# Patient Record
Sex: FEMALE | Race: WHITE | Employment: OTHER | URBAN - METROPOLITAN AREA
[De-identification: names, ages, dates, MRNs, and addresses within clinical notes are randomized per-mention and may not be internally consistent; named-entity substitution may affect disease eponyms.]

---

## 2018-09-05 ENCOUNTER — APPOINTMENT (OUTPATIENT)
Dept: GENERAL RADIOLOGY | Facility: CLINIC | Age: 68
End: 2018-09-05
Attending: EMERGENCY MEDICINE
Payer: MEDICARE

## 2018-09-05 ENCOUNTER — HOSPITAL ENCOUNTER (EMERGENCY)
Facility: CLINIC | Age: 68
Discharge: HOME | End: 2018-09-05
Attending: EMERGENCY MEDICINE | Admitting: EMERGENCY MEDICINE
Payer: MEDICARE

## 2018-09-05 ENCOUNTER — APPOINTMENT (OUTPATIENT)
Dept: CT IMAGING | Facility: CLINIC | Age: 68
End: 2018-09-05
Attending: EMERGENCY MEDICINE
Payer: MEDICARE

## 2018-09-05 VITALS
RESPIRATION RATE: 18 BRPM | SYSTOLIC BLOOD PRESSURE: 102 MMHG | TEMPERATURE: 97.5 F | OXYGEN SATURATION: 100 % | DIASTOLIC BLOOD PRESSURE: 71 MMHG

## 2018-09-05 DIAGNOSIS — S42.201A CLOSED FRACTURE OF PROXIMAL END OF RIGHT HUMERUS, UNSPECIFIED FRACTURE MORPHOLOGY, INITIAL ENCOUNTER: ICD-10-CM

## 2018-09-05 PROCEDURE — 99284 EMERGENCY DEPT VISIT MOD MDM: CPT | Mod: 25

## 2018-09-05 PROCEDURE — 70450 CT HEAD/BRAIN W/O DYE: CPT

## 2018-09-05 PROCEDURE — 23600 CLTX PROX HUMRL FX W/O MNPJ: CPT

## 2018-09-05 PROCEDURE — 73060 X-RAY EXAM OF HUMERUS: CPT | Mod: RT

## 2018-09-05 PROCEDURE — 25000132 ZZH RX MED GY IP 250 OP 250 PS 637: Mod: GY | Performed by: EMERGENCY MEDICINE

## 2018-09-05 PROCEDURE — 73090 X-RAY EXAM OF FOREARM: CPT | Mod: RT

## 2018-09-05 PROCEDURE — 72125 CT NECK SPINE W/O DYE: CPT

## 2018-09-05 PROCEDURE — A9270 NON-COVERED ITEM OR SERVICE: HCPCS | Mod: GY | Performed by: EMERGENCY MEDICINE

## 2018-09-05 RX ORDER — LEVOTHYROXINE SODIUM 100 UG/1
TABLET ORAL
COMMUNITY
Start: 2018-08-13

## 2018-09-05 RX ORDER — WARFARIN SODIUM 10 MG/1
TABLET ORAL
COMMUNITY
Start: 2017-12-12

## 2018-09-05 RX ORDER — WARFARIN SODIUM 5 MG/1
TABLET ORAL
COMMUNITY
Start: 2018-01-18

## 2018-09-05 RX ORDER — FERROUS SULFATE 325(65) MG
325 TABLET ORAL
COMMUNITY
Start: 2015-11-03

## 2018-09-05 RX ORDER — OXYCODONE HYDROCHLORIDE 5 MG/1
5 TABLET ORAL ONCE
Status: COMPLETED | OUTPATIENT
Start: 2018-09-05 | End: 2018-09-05

## 2018-09-05 RX ORDER — OXYCODONE HYDROCHLORIDE 5 MG/1
5 TABLET ORAL EVERY 6 HOURS PRN
Qty: 12 TABLET | Refills: 0 | Status: SHIPPED | OUTPATIENT
Start: 2018-09-05

## 2018-09-05 RX ORDER — ACETAMINOPHEN 325 MG/1
975 TABLET ORAL ONCE
Status: COMPLETED | OUTPATIENT
Start: 2018-09-05 | End: 2018-09-05

## 2018-09-05 RX ORDER — ACETAMINOPHEN 500 MG
500 TABLET ORAL EVERY 6 HOURS PRN
Qty: 60 TABLET | Refills: 0 | Status: SHIPPED | OUTPATIENT
Start: 2018-09-05 | End: 2018-09-12

## 2018-09-05 RX ADMIN — OXYCODONE HYDROCHLORIDE 5 MG: 5 TABLET ORAL at 09:04

## 2018-09-05 RX ADMIN — ACETAMINOPHEN 975 MG: 325 TABLET, FILM COATED ORAL at 09:04

## 2018-09-05 ASSESSMENT — ENCOUNTER SYMPTOMS
NECK PAIN: 0
ARTHRALGIAS: 1
MYALGIAS: 1

## 2018-09-05 NOTE — ED AVS SNAPSHOT
[facility] Emergency Department    [address]    [facility] [address]    Phone:  [phone]    Fax:  [phone]                                       Vickie Gama   MRN: [id number]    Department:  [facility] Emergency Department   Date of Visit:  9/5/2018           After Visit Summary Signature Page     I have received my discharge instructions, and my questions have been answered. I have discussed any challenges I see with this plan with the nurse or doctor.    ..........................................................................................................................................  Patient/Patient Representative Signature      ..........................................................................................................................................  Patient Representative Print Name and Relationship to Patient    ..................................................               ................................................  Date                                            Time    ..........................................................................................................................................  Reviewed by Signature/Title    ...................................................              ..............................................  Date                                                            Time          22EPIC Rev 08/18

## 2018-09-05 NOTE — ED PROVIDER NOTES
History     Chief Complaint:  Arm pain after a fall     HPI:   The history is provided by the patient.      Vickie Gama is a 67 year old female on Coumadin with a history of anemia, PE/DVT, thyroid disease who presents to the emergency department with a male  for evaluation after a fall. The patient was outside walking this morning when she slipped in some leaves onto a rock. Her right shoulder/bicep took most of the impact. She did graze her head but had no loss of consciousness and remembers the entire incident. She was able to walk after the incident. She has exacerbated pain with any movement and does have a component of pain in the forearm. They present today for evaluation. Here, the patient rates her pain a 10/10 in severity. No lower extremity pain or balance issues. Denies any elbow, chest wall, or neck pain and has no other complaints.      Allergies:  Penicillins  Sulfa Drugs      Medications:    Iron   Levothyroxine   Prozac  Prilosec   Coumadin 10 mg   Coumadin 5 mg      Past Medical History:    Anemia   Depressive disorder  Thyroid disease   PE  DVT  Protein S deficiency     Past Surgical History:    Appendectomy   EGD  Gyn surgery     Family History:    History reviewed. No pertinent family history.      Social History:  Presents with male     Tobacco use: 1.00 PPD   Alcohol use: No   PCP: Sury Mackay    Marital Status:        Review of Systems   Musculoskeletal: Positive for arthralgias and myalgias. Negative for gait problem and neck pain.   Neurological: Negative for syncope.   All other systems reviewed and are negative.    Physical Exam     Patient Vitals for the past 24 hrs:   BP Temp Temp src Heart Rate Resp SpO2   09/05/18 1049 151/82 - - 65 - 97 %   09/05/18 0959 149/86 - - 67 - 98 %   09/05/18 0819 (!) 150/99 97.5  F (36.4  C) Oral 77 16 96 %        Physical Exam  Eyes: EOMI  ENT: No hemotympanum, dental trauma, or oropharyngeal  lacerations  Respiratory: Normal effort, symmetric chest rise, breath sounds equal bilaterally  Cardiovascular: Distal pulses palpable and symmetric, distal extremities warm, heart rate normal and rhythm regular  Gastrointestinal: No tenderness to palpation or guarding  MSK: No tenderness of facial bones, no step offs of scalp, no cervical spine tenderness or step offs, full ROM of neck without pain or rigidity, no thoracic or lumbar spine tenderness or step offs, no chest wall tenderness or crepitus, no long bone deformities or tenderness, full ROM of all extremities.     Right upper extremity: Holding right arm across her torso. Full strength and sensation of all three nerves of hand and forearm. Elbow and shoulder range of motion was deferred due to pain. Tenderness at the radial head. Tenderness of the humerus. No clavicle or chest wall tenderness.   Skin: No diaphoresis, lacerations, or lesions  Neurologic: Alert and oriented, follows commands in all extremities, sensation to light touch intact in all extremities, full strength and coordination    Emergency Department Course     Imaging:  Radiographic findings were communicated with the patient and family who voiced understanding of the findings.     CT Head without contrast:  IMPRESSION:  Diffuse cerebral volume loss and cerebral white matter  changes consistent with chronic small vessel ischemic disease. No  evidence for acute intracranial pathology.     CT Cervical spine without contrast:  IMPRESSION: Diffuse degenerative changes of the cervical spine. No  evidence for fracture or traumatic malalignment.     XR Humerus G/E 2 views, Right:  IMPRESSION: Fracture of the proximal humerus involving the humeral  neck.    XR Radius/Ulna, PA and LAT, right:  IMPRESSION: Negative.    Imaging independently reviewed and agree with radiologist interpretation.      Interventions:  0904: Tylenol 975 mg PO   0904: Roxicodone 5 mg PO      The patient's symptoms were  improved with oral narcotics.      Emergency Department Course:  Past medical records, nursing notes, and vitals reviewed.  0846: I performed an exam of the patient and obtained history, as documented above.     IV inserted and blood drawn. This was sent to the lab for further testing, results above.   Above interventions provided.      The patient was sent for a CT x 2 and XR x 2 while in the emergency department, findings above.     1030: I spoke with MARIS Moody of orthopedics regarding this patient.      1041: I rechecked the patient. Explained findings to patient.     I personally reviewed the laboratory results with the Patient and friend and answered all related questions prior to discharge.       1154: I rechecked the patient. Findings and plan explained to the Patient and friend. Patient discharged home with instructions regarding supportive care, medications, and reasons to return. The importance of close follow-up was reviewed.       Impression & Plan      Medical Decision Making:  Patient who presents with right arm/shoulder pain after mechanical fall.  She is on Coumadin and did strike her head.  There was no evidence of head trauma and she did not have loss of consciousness.  She was given oxycodone and tylenol for pain. Given her age and anticoagulation, CT of the head and cervical spine without contrast were performed, in addition to the indicated right upper extremity x-rays based on exam.  CTs were negative.  The x-rays demonstrated a right proximal humerus fracture with involvement of the surgical neck.  She had distal CMS intact.  After discussion over the phone with orthopedics and their review of the imaging, it was recommended that she have outpatient management of this injury.  She was placed in a sling and will have follow-up with Dr. Malcolm for [facility] orthopedics within the next week to determine future management.  She was discharged home with prescriptions for Tylenol and  oxycodone for pain control.  She was given return precautions for any numbness, weakness, severe pain.    Diagnosis:    ICD-10-CM    1. Closed fracture of proximal end of right humerus, unspecified fracture morphology, initial encounter S42.201A        Disposition:  Discharged to home with plan as outlined.     Discharge Medications:  New Prescriptions    ACETAMINOPHEN (TYLENOL) 500 MG TABLET    Take 1 tablet (500 mg) by mouth every 6 hours as needed for pain    OXYCODONE IR (ROXICODONE) 5 MG TABLET    Take 1 tablet (5 mg) by mouth every 6 hours as needed for pain     Scribe Disclosure:   Shashi PATRICIO, am serving as a scribe at 8:46 AM on 9/5/2018 to document services personally performed by Eulogio Echevarria MD based on my observations and the provider's statements to me.       Eulogio Echevarria MD  9/5/2018   [facility] EMERGENCY DEPARTMENT       Eulogio Echevarria MD  09/05/18 4827

## 2018-09-05 NOTE — ED AVS SNAPSHOT
[facility] Emergency Department    [address]    [facility] [address]    Phone:  [phone]    Fax:  [phone]                                       Vickie Gama   MRN: [id number]    Department:  [facility] Emergency Department   Date of Visit:  9/5/2018           Patient Information     Date Of Birth          1950        Your diagnoses for this visit were:     Closed fracture of proximal end of right humerus, unspecified fracture morphology, initial encounter        You were seen by Eulogio Echevarria MD.      Follow-up Information     Follow up with Nasim Malcolm MD. Schedule an appointment as soon as possible for a visit in 3 days.    Specialty:  Orthopedics    Contact information:    [facility] ORTHOPEDICS  [address]  [facility] [address]  [phone]          Discharge Instructions         Shoulder Fracture  You have a break (fracture) of the shoulder. This may be a small crack in the bone. Or it may be a major break with the broken parts pushed out of position.     If you have only a crack in the bone and no bone fragments are out of place, you will probably be treated with a shoulder immobilizer. This is a special type of sling. Casts are usually not used for this type of fracture. Your bone should heal in 4 to 8 weeks. More serious injuries may need surgery to put the bones back into the correct position for healing.  Home care  Follow these tips to care for yourself at home:    Leave the shoulder immobilizer in place. This will support the injured arm at your side. This is the best position for the bone to heal.    The shoulder immobilizer is adjustable. If it becomes loose, adjust it so that your forearm is level with the ground (horizontal). Your hand should be level with your elbow.    Apply an ice pack to the injured area for 20 minutes every 1 to 2 hours the first day. You can make an ice pack by putting ice cubes in a plastic bag.  A bag of frozen peas or something similar works well too. Wrap the bag in a towel before putting it on your shoulder. Continue with ice packs 3 to 4 times a day for the next 2 to 3 days. Then use the ice as needed to relieve pain and swelling.    You may take acetaminophen or ibuprofen to relieve pain, unless another pain medicine was prescribed. If you have chronic liver or kidney disease or ever had a stomach ulcer or gastrointestinal bleeding, talk with your doctor before using these medicines.    Don t take the sling off before your next exam unless you were told to do so. Ask if you should move your elbow, wrist, and hand.  Follow-up care  Follow up with your healthcare provider, or as advised.  A shoulder joint will become stiff if left in a sling for too long. Ask your doctor when it is safe to begin range-of-motion exercises.  When to seek medical advice  Call your healthcare provider right away if any of these occur:    Your fingers become swollen, cold, blue, numb, or tingly    Your shoulder or upper arm swells a lot or looks very bruised    The pain in your shoulder gets worse    The splint or immobilizer breaks    You have a fever or chills  Date Last Reviewed: 8/1/2016 2000-2017 [employer]. [address] [place] [address]. All rights reserved. This information is not intended as a substitute for professional medical care. Always follow your healthcare professional's instructions.          24 Hour Appointment Hotline       To make an appointment at any [facility], call [phone] ([phone]). If you don't have a family doctor or clinic, we will help you find one. [facility] are conveniently located to serve the needs of you and your family.             Review of your medicines      START taking        Dose / Directions Last dose taken    acetaminophen 500 MG tablet   Commonly known as:  TYLENOL   Dose:  500 mg   Quantity:  60 tablet        Take 1 tablet  (500 mg) by mouth every 6 hours as needed for pain   Refills:  0        oxyCODONE IR 5 MG tablet   Commonly known as:  ROXICODONE   Dose:  5 mg   Quantity:  12 tablet        Take 1 tablet (5 mg) by mouth every 6 hours as needed for pain   Refills:  0          Our records show that you are taking the medicines listed below. If these are incorrect, please call your family doctor or clinic.        Dose / Directions Last dose taken    * ferrous sulfate 325 (65 Fe) MG tablet   Commonly known as:  IRON   Dose:  325 mg   Quantity:  30 tablet        Take 1 tablet (325 mg) by mouth daily   Refills:  1        * ferrous sulfate 325 (65 Fe) MG tablet   Commonly known as:  IRON   Dose:  325 mg        Take 325 mg by mouth   Refills:  0        * LEVOTHYROXINE SODIUM PO   Dose:  100 mcg        Take 100 mcg by mouth daily   Refills:  0        * levothyroxine 100 MCG tablet   Commonly known as:  SYNTHROID/LEVOTHROID        Take 1 Tab by mouth daily.   Refills:  0        * omeprazole 20 MG CR capsule   Commonly known as:  priLOSEC   Dose:  20 mg   Quantity:  30 capsule        Take 1 capsule (20 mg) by mouth every morning   Refills:  2        * omeprazole 20 MG CR capsule   Commonly known as:  priLOSEC   Dose:  20 mg        Take 20 mg by mouth   Refills:  0        * PROZAC PO   Dose:  40 mg        Take 40 mg by mouth At Bedtime   Refills:  0        * FLUoxetine 20 MG capsule   Commonly known as:  PROzac   Dose:  40 mg        Take 40 mg by mouth   Refills:  0        * WARFARIN SODIUM PO   Dose:  10 mg        Take 10 mg by mouth daily   Refills:  0        * warfarin 10 MG tablet   Commonly known as:  COUMADIN        Ttake 0.5 to 2 tablets by mouth daily. Adjust dose per INR value and instructions.   Refills:  0        * warfarin 5 MG tablet   Commonly known as:  COUMADIN        TAKE 0.5-2 TABLETS BY MOUTH DAILY (EVERY 24 HOURS). ADJUST DOSE PER INR VALUE AND INSTRUCTIONS.   Refills:  0        * Notice:  This list has 11 medication(s)  that are the same as other medications prescribed for you. Read the directions carefully, and ask your doctor or other care provider to review them with you.            Information about OPIOIDS     PRESCRIPTION OPIOIDS: WHAT YOU NEED TO KNOW   We gave you an opioid (narcotic) pain medicine. It is important to manage your pain, but opioids are not always the best choice. You should first try all the other options your care team gave you. Take this medicine for as short a time (and as few doses) as possible.    Some activities can increase your pain, such as bandage changes or therapy sessions. It may help to take your pain medicine 30 to 60 minutes before these activities. Reduce your stress by getting enough sleep, working on hobbies you enjoy and practicing relaxation or meditation. Talk to your care team about ways to manage your pain beyond prescription opioids.    These medicines have risks:    DO NOT drive when on new or higher doses of pain medicine. These medicines can affect your alertness and reaction times, and you could be arrested for driving under the influence (DUI). If you need to use opioids long-term, talk to your care team about driving.    DO NOT operate heavy machinery    DO NOT do any other dangerous activities while taking these medicines.    DO NOT drink any alcohol while taking these medicines.     If the opioid prescribed includes acetaminophen, DO NOT take with any other medicines that contain acetaminophen. Read all labels carefully. Look for the word  acetaminophen  or  Tylenol.  Ask your pharmacist if you have questions or are unsure.    You can get addicted to pain medicines, especially if you have a history of addiction (chemical, alcohol or substance dependence). Talk to your care team about ways to reduce this risk.    All opioids tend to cause constipation. Drink plenty of water and eat foods that have a lot of fiber, such as fruits, vegetables, prune juice, apple juice and  high-fiber cereal. Take a laxative (Miralax, milk of magnesia, Colace, Senna) if you don t move your bowels at least every other day. Other side effects include upset stomach, sleepiness, dizziness, throwing up, tolerance (needing more of the medicine to have the same effect), physical dependence and slowed breathing.    Store your pills in a secure place, locked if possible. We will not replace any lost or stolen medicine. If you don t finish your medicine, please throw away (dispose) as directed by your pharmacist. The [place] [employer] has more information about safe disposal: https://www.pca.[facility].[place].us/living-green/managing-unwanted-medications        Prescriptions were sent or printed at these locations (2 Prescriptions)                   Other Prescriptions                Printed at Department/Unit printer (2 of 2)         acetaminophen (TYLENOL) 500 MG tablet               oxyCODONE IR (ROXICODONE) 5 MG tablet                Procedures and tests performed during your visit     Cervical spine CT w/o contrast    Head CT w/o contrast    Humerus XR, G/E 2 views, right    Radius/Ulna XR, PA & LAT, right      Orders Needing Specimen Collection     None      Pending Results     Date and Time Order Name Status Description    9/5/2018 0858 Cervical spine CT w/o contrast Preliminary     9/5/2018 0858 Head CT w/o contrast Preliminary             Pending Culture Results     No orders found from 9/3/2018 to 9/6/2018.            Pending Results Instructions     If you had any lab results that were not finalized at the time of your Discharge, you can call the ED Lab Result RN at [phone]. You will be contacted by this team for any positive Lab results or changes in treatment. The nurses are available 7 days a week from 10A to 6:30P.  You can leave a message 24 hours per day and they will return your call.        Test Results From Your Hospital Stay        9/5/2018  9:50 AM      Narrative     CT OF THE  HEAD WITHOUT CONTRAST  9/5/2018 9:46 AM     COMPARISON: Brain MRI 10/27/2015.    HISTORY:  Trauma on coumadin.     TECHNIQUE: 5 mm thick axial CT images of the head were acquired  without IV contrast material.    FINDINGS:  There is mild diffuse cerebral volume loss. There are  subtle patchy areas of decreased density in the cerebral white matter  bilaterally that are consistent with sequela of chronic small vessel  ischemic disease.     The ventricles and basal cisterns are within normal limits in  configuration given the degree of cerebral volume loss.  There is no  midline shift. There are no extra-axial fluid collections.     No intracranial hemorrhage, mass or recent infarct.    The visualized paranasal sinuses are well aerated. There is no  mastoiditis. There are no fractures of the visualized bones.         Impression     IMPRESSION:  Diffuse cerebral volume loss and cerebral white matter  changes consistent with chronic small vessel ischemic disease. No  evidence for acute intracranial pathology.     Radiation dose for this scan was reduced using automated exposure  control, adjustment of the mA and/or kV according to patient size, or  iterative reconstruction technique.         9/5/2018  9:51 AM      Narrative     CT OF THE CERVICAL SPINE WITHOUT CONTRAST  9/5/2018 9:46 AM     COMPARISON: None.    HISTORY:  Trauma.      TECHNIQUE: Axial images of the cervical spine were acquired without  intravenous contrast. Multiplanar reformations were created.      FINDINGS: There is mild degenerative anterolisthesis of C4 on C5.  Alignment of the cervical vertebrae is otherwise normal; however,  there is reversal of normal cervical lordosis centered at the C4-C5  level. Vertebral body heights of the cervical spine are normal.  Craniocervical alignment is normal. There are no fractures of the  cervical spine. There is degenerative endplate spurring at the C4-C5,  C5-C6, C6-C7 and C7-T1 levels. There is moderate-severe  facet  arthropathy throughout the cervical spine. There is mild degenerative  spinal canal narrowing at the C5-C6 level. There is no other spinal  canal narrowing of the cervical spine. There is no prevertebral soft  tissue swelling.        Impression     IMPRESSION: Diffuse degenerative changes of the cervical spine. No  evidence for fracture or traumatic malalignment.     Radiation dose for this scan was reduced using automated exposure  control, adjustment of the mA and/or kV according to patient size, or  iterative reconstruction technique.         9/5/2018  9:57 AM      Narrative     XR HUMERUS RT G/E 2 VW 9/5/2018 9:56 AM    HISTORY: trauma;         Impression     IMPRESSION: Fracture of the proximal humerus involving the humeral  neck.    SANGEETA SILVER MD         9/5/2018  9:57 AM      Narrative     XR FOREARM RT 2 VW 9/5/2018 9:55 AM    HISTORY: trauma;         Impression     IMPRESSION: Negative.    SANGEETA SILVER MD                Clinical Quality Measure: Blood Pressure Screening     Your blood pressure was checked while you were in the emergency department today. The last reading we obtained was  BP: 151/82 . Please read the guidelines below about what these numbers mean and what you should do about them.  If your systolic blood pressure (the top number) is less than 120 and your diastolic blood pressure (the bottom number) is less than 80, then your blood pressure is normal. There is nothing more that you need to do about it.  If your systolic blood pressure (the top number) is 120-139 or your diastolic blood pressure (the bottom number) is 80-89, your blood pressure may be higher than it should be. You should have your blood pressure rechecked within a year by a primary care provider.  If your systolic blood pressure (the top number) is 140 or greater or your diastolic blood pressure (the bottom number) is 90 or greater, you may have high blood pressure. High blood pressure is treatable, but if left  "untreated over time it can put you at risk for heart attack, stroke, or kidney failure. You should have your blood pressure rechecked by a primary care provider within the next 4 weeks.  If your provider in the emergency department today gave you specific instructions to follow-up with your doctor or provider even sooner than that, you should follow that instruction and not wait for up to 4 weeks for your follow-up visit.        Thank you for choosing [facility]       Thank you for choosing [facility] for your care. Our goal is always to provide you with excellent care. Hearing back from our patients is one way we can continue to improve our services. Please take a few minutes to complete the written survey that you may receive in the mail after you visit with us. Thank you!        [facility] Information     [email or web address] lets you send messages to your doctor, view your test results, renew your prescriptions, schedule appointments and more. To sign up, go to [email or web address] . Click on \"Log in\" on the left side of the screen, which will take you to the Welcome page. Then click on \"Sign up Now\" on the right side of the page.     You will be asked to enter the access code listed below, as well as some personal information. Please follow the directions to create your username and password.     Your access code is: [email or web address]  Expires: 2018 11:56 AM     Your access code will  in 90 days. If you need help or a new code, please call your [facility] clinic or [phone].        Care EveryWhere ID     This is your Care EveryWhere ID. This could be used by other organizations to access your [facility] medical records  [id number]        Equal Access to Services     [facility] : Hadii lula Ingram, wamecca garcia, qaybjavier lind. So [facility] [phone].    ATENCIÓN: Si habla español, tiene a tello disposición servicios gratuitos de asistencia lingüística. " Brianne morales [phone].    We comply with applicable federal civil rights laws and [place] laws. We do not discriminate on the basis of race, color, national origin, age, disability, sex, sexual orientation, or gender identity.            After Visit Summary       This is your record. Keep this with you and show to your community pharmacist(s) and doctor(s) at your next visit.

## 2018-09-05 NOTE — DISCHARGE INSTRUCTIONS
Shoulder Fracture  You have a break (fracture) of the shoulder. This may be a small crack in the bone. Or it may be a major break with the broken parts pushed out of position.     If you have only a crack in the bone and no bone fragments are out of place, you will probably be treated with a shoulder immobilizer. This is a special type of sling. Casts are usually not used for this type of fracture. Your bone should heal in 4 to 8 weeks. More serious injuries may need surgery to put the bones back into the correct position for healing.  Home care  Follow these tips to care for yourself at home:    Leave the shoulder immobilizer in place. This will support the injured arm at your side. This is the best position for the bone to heal.    The shoulder immobilizer is adjustable. If it becomes loose, adjust it so that your forearm is level with the ground (horizontal). Your hand should be level with your elbow.    Apply an ice pack to the injured area for 20 minutes every 1 to 2 hours the first day. You can make an ice pack by putting ice cubes in a plastic bag. A bag of frozen peas or something similar works well too. Wrap the bag in a towel before putting it on your shoulder. Continue with ice packs 3 to 4 times a day for the next 2 to 3 days. Then use the ice as needed to relieve pain and swelling.    You may take acetaminophen or ibuprofen to relieve pain, unless another pain medicine was prescribed. If you have chronic liver or kidney disease or ever had a stomach ulcer or gastrointestinal bleeding, talk with your doctor before using these medicines.    Don t take the sling off before your next exam unless you were told to do so. Ask if you should move your elbow, wrist, and hand.  Follow-up care  Follow up with your healthcare provider, or as advised.  A shoulder joint will become stiff if left in a sling for too long. Ask your doctor when it is safe to begin range-of-motion exercises.  When to seek medical  advice  Call your healthcare provider right away if any of these occur:    Your fingers become swollen, cold, blue, numb, or tingly    Your shoulder or upper arm swells a lot or looks very bruised    The pain in your shoulder gets worse    The splint or immobilizer breaks    You have a fever or chills  Date Last Reviewed: 8/1/2016 2000-2017 [employer]. [address] [place] [address]. All rights reserved. This information is not intended as a substitute for professional medical care. Always follow your healthcare professional's instructions.

## 2018-09-05 NOTE — ED NOTES
A/O. VSS. PIV removed. Pt verbalized understanding of d/c instructions and ambulated to lobby steady gait.   Script rx oxycodone & tylenol given to pt at time of d/c